# Patient Record
Sex: MALE | Race: WHITE | NOT HISPANIC OR LATINO
[De-identification: names, ages, dates, MRNs, and addresses within clinical notes are randomized per-mention and may not be internally consistent; named-entity substitution may affect disease eponyms.]

---

## 2018-05-25 PROBLEM — Z00.00 ENCOUNTER FOR PREVENTIVE HEALTH EXAMINATION: Status: ACTIVE | Noted: 2018-05-25

## 2018-06-27 ENCOUNTER — APPOINTMENT (OUTPATIENT)
Dept: HEART AND VASCULAR | Facility: CLINIC | Age: 63
End: 2018-06-27
Payer: COMMERCIAL

## 2018-06-27 VITALS
WEIGHT: 193 LBS | HEIGHT: 70.5 IN | SYSTOLIC BLOOD PRESSURE: 145 MMHG | BODY MASS INDEX: 27.32 KG/M2 | OXYGEN SATURATION: 97 % | HEART RATE: 87 BPM | DIASTOLIC BLOOD PRESSURE: 90 MMHG

## 2018-06-27 PROCEDURE — 93000 ELECTROCARDIOGRAM COMPLETE: CPT

## 2018-06-27 PROCEDURE — 99205 OFFICE O/P NEW HI 60 MIN: CPT | Mod: 25

## 2018-06-27 RX ORDER — ATORVASTATIN CALCIUM 40 MG/1
40 TABLET, FILM COATED ORAL
Qty: 90 | Refills: 3 | Status: ACTIVE | COMMUNITY
Start: 2018-06-27

## 2019-03-20 ENCOUNTER — APPOINTMENT (OUTPATIENT)
Dept: HEART AND VASCULAR | Facility: CLINIC | Age: 64
End: 2019-03-20
Payer: COMMERCIAL

## 2019-03-20 ENCOUNTER — APPOINTMENT (OUTPATIENT)
Dept: CARDIOTHORACIC SURGERY | Facility: CLINIC | Age: 64
End: 2019-03-20
Payer: COMMERCIAL

## 2019-03-20 ENCOUNTER — NON-APPOINTMENT (OUTPATIENT)
Age: 64
End: 2019-03-20

## 2019-03-20 VITALS
WEIGHT: 183 LBS | DIASTOLIC BLOOD PRESSURE: 83 MMHG | BODY MASS INDEX: 25.89 KG/M2 | SYSTOLIC BLOOD PRESSURE: 147 MMHG | OXYGEN SATURATION: 97 % | HEART RATE: 67 BPM

## 2019-03-20 VITALS
RESPIRATION RATE: 18 BRPM | SYSTOLIC BLOOD PRESSURE: 143 MMHG | DIASTOLIC BLOOD PRESSURE: 84 MMHG | HEART RATE: 88 BPM | TEMPERATURE: 98.1 F | OXYGEN SATURATION: 98 %

## 2019-03-20 PROCEDURE — 99204 OFFICE O/P NEW MOD 45 MIN: CPT

## 2019-03-20 PROCEDURE — 99214 OFFICE O/P EST MOD 30 MIN: CPT

## 2019-03-20 RX ORDER — METOPROLOL SUCCINATE 25 MG/1
25 TABLET, EXTENDED RELEASE ORAL DAILY
Qty: 90 | Refills: 3 | Status: ACTIVE | COMMUNITY
Start: 2019-03-20 | End: 1900-01-01

## 2019-03-20 NOTE — PHYSICAL EXAM
[General Appearance - Well Developed] : well developed [Normal Appearance] : normal appearance [Well Groomed] : well groomed [General Appearance - Well Nourished] : well nourished [No Deformities] : no deformities [General Appearance - In No Acute Distress] : no acute distress [Normal Conjunctiva] : the conjunctiva exhibited no abnormalities [Eyelids - No Xanthelasma] : the eyelids demonstrated no xanthelasmas [Normal Oral Mucosa] : normal oral mucosa [No Oral Pallor] : no oral pallor [No Oral Cyanosis] : no oral cyanosis [Normal Jugular Venous A Waves Present] : normal jugular venous A waves present [Normal Jugular Venous V Waves Present] : normal jugular venous V waves present [No Jugular Venous Woods A Waves] : no jugular venous woods A waves [Respiration, Rhythm And Depth] : normal respiratory rhythm and effort [Exaggerated Use Of Accessory Muscles For Inspiration] : no accessory muscle use [Auscultation Breath Sounds / Voice Sounds] : lungs were clear to auscultation bilaterally [Heart Rate And Rhythm] : heart rate and rhythm were normal [Abnormal Walk] : normal gait [Gait - Sufficient For Exercise Testing] : the gait was sufficient for exercise testing [Nail Clubbing] : no clubbing of the fingernails [Cyanosis, Localized] : no localized cyanosis [Petechial Hemorrhages (___cm)] : no petechial hemorrhages [Skin Color & Pigmentation] : normal skin color and pigmentation [] : no rash [No Venous Stasis] : no venous stasis [Skin Lesions] : no skin lesions [No Skin Ulcers] : no skin ulcer [No Xanthoma] : no  xanthoma was observed [Oriented To Time, Place, And Person] : oriented to person, place, and time [Mood] : the mood was normal [Affect] : the affect was normal [No Anxiety] : not feeling anxious [FreeTextEntry1] : 2/6 diastolic murmur at the RUSB

## 2019-03-21 NOTE — DATA REVIEWED
[FreeTextEntry1] : Echocardiogram 3/13/19 revealed:\par LVEF 55%. mild to moderate aortic regurgitation. aortic root is dilated at 4.45cm. ascending aorta measures 3.86. trileaflet aortic valve. \par \par Cardiac MRI 1/30/19 revealed:\par -aortic root 49mm. ascending aorta 37mm. mild aortic insufficiency. \par -mild enlargement of the left ventricle with normal regional systolic function. LVEF 54%.\par -there are no areas of focal hyperenhancement, consistent with absence of myocardial scarring/fibrosis.

## 2019-03-21 NOTE — CONSULT LETTER
[Dear  ___] : Dear  [unfilled], [FreeTextEntry2] : David Still MD [FreeTextEntry1] : I had the pleasure of seeing your patient, VANESSA FERRARA , in my office today. We take a multidisciplinary team approach to patient care and consider you, the referring physician, an extension of our team. We will maintain an open line of communication with you throughout your patient's treatment course.  \par \par Mr. FERRARA presents for an initial evaluation and management of thoracic aortic aneurysm. I have reviewed all of his  medical records and diagnostic images at the time of his office consultation. I have enclosed a copy for your records. \par \par I have also reviewed the indications for surgery, and used our webpage <<[email or web address]>> to illustrate the aorta and anatomy of the heart. The patient does not meet size criteria for surgical intervention at the time. \par \par Therefore, I have recommended that the patient will require a follow up appointment in 6 months with MRA chest to monitor his aortic pathology. My office will assist the patient with his upcoming appointment and I will update you on his progress at that time.\par \par I have discussed with the patient that we will continue to monitor his  aortic pathology closely at the [facility] that encompasses the entire health care system and is one of the largest in the nation at this point.\par \par It is our commitment to provide your patient with the highest quality of advanced therapeutic options. On behalf of the Cardiothoracic Surgery Team, thank you for sending your patient to the [facility] based at [facility].  If there are any questions or concerns, please call me directly at [phone].  \par \par Please see my note below. \par \par Sincerely, \par \par \par \par \par Derek Douglas M.D.\par Professor of Cardiovascular and Thoracic Surgery\par Minimally Invasive Valve Surgeon\par Director of Aortic Surgery, [facility]\par Cell: [phone]\par Email: [email or web address] \par \par [facility]:\par [address], [place] [address]\par Office: [phone]\par Fax: [phone]\par \par [facility]:\par Department of Cardiovascular and Thoracic Surgery\par [address] [place] [address], [address]\par Office: [phone]\par Fax: [phone]\par \par Practice Manager: Ms. Fiordaliza Villagomez\par Email: [email or web address]\par Phone: [phone]\par

## 2019-03-21 NOTE — HISTORY OF PRESENT ILLNESS
[FreeTextEntry1] : 63 year old male with a past medical history of hypertension, hyperlipidemia, RAMONA, CKD stage III (GFR 52), presents for evaluation and management of an ascending aortic aneurysm and aortic insufficiency.  \par \par Echocardiogram 3/13/19 revealed:\par LVEF 55%. mild to moderate aortic regurgitation. aortic root is dilated at 4.45cm. ascending aorta measures 3.86. trileaflet aortic valve. \par \par Cardiac MRI 1/30/19 revealed:\par -aortic root 49mm. ascending aorta 37mm. mild aortic insufficiency. \par -mild enlargement of the left ventricle with normal regional systolic function. LVEF 54%.\par -there are no areas of focal hyperenhancement, consistent with absence of myocardial scarring/fibrosis. \par my measurement of the aortic root is 45mm. \par \par Patient is doing well and denies recent hospitalization, ER visits, or surgeries. He  denies fever, chills, fatigue, headache, blurred vision, dizziness, syncope, chest pain, palpitations, shortness of breath, orthopnea, paroxysmal nocturnal dyspnea, nausea, vomiting, abdominal pain, back pain, BRBPR or swelling to legs.\par

## 2019-03-21 NOTE — ASSESSMENT
[FreeTextEntry1] : 63 year old male with a past medical history of hypertension, hyperlipidemia, RAMONA, CKD stage III (GFR 52), presents for evaluation and management of an ascending aortic aneurysm and aortic insufficiency.  \par \par Echocardiogram 3/13/19 revealed:\par LVEF 55%. mild to moderate aortic regurgitation. aortic root is dilated at 4.45cm. ascending aorta measures 3.86. trileaflet aortic valve. \par \par Cardiac MRI 1/30/19 revealed:\par -aortic root 49mm. ascending aorta 37mm. mild aortic insufficiency. \par -mild enlargement of the left ventricle with normal regional systolic function. LVEF 54%.\par -there are no areas of focal hyperenhancement, consistent with absence of myocardial scarring/fibrosis. \par my measurement of the aortic root is 45mm. \par \par I have reviewed the patient's medical records, diagnostic images during the time of this office consultation and have made the following recommendation. Review of the imaging shows his  aortic pathology has remained stable and does not require surgical intervention.\par \par Plan\par \par 1. Follow up in [facility] in six months with a MRA chest (to be scheduled via [facility]). \par 2. Continue medication regimen.\par 3. Follow up with cardiologist and PCP.\par

## 2019-03-21 NOTE — PROCEDURE
[FreeTextEntry1] : Dr. Douglas reviewed the indications for surgery, and used our webpage [email or web address] <[email or web address]> to illustrate the aorta and anatomy of the heart. Those indications are the following: size greater than 5.0 cm, symptomatic aneurysms, family history of aortic dissection or aneurysm death with a size greater than 4.5 cm, other necessary cardiac procedures such as coronary artery bypass grafting or valvular disorders with an aneurysm greater than 4.5 cm, or connective tissue disorders with an aneurysm size greater than 4.5 cm. The patient does not meet size criteria for surgical intervention at the time.\par \par Dr. Douglas discussed activity restrictions with the patient, and would advise exercise at a moderate amount with no heavy lifting over one third of body weight, and avoiding heart rates that exceed 140 beats per minute. In addition, every patient should abstain from tobacco abuse and to avoid all illicit drug use, especially stimulants such as cocaine or methamphetamine. Dr. Douglas also counseled regarding maintaining a healthy heart diet, and losing any excessive weight as this also put undue stress on both the aorta and entire cardiovascular system. First degree family members should be screened for bicuspid valve disease, and ascending aortic aneurysms. \par \par

## 2019-03-21 NOTE — PHYSICAL EXAM
[General Appearance - Alert] : alert [General Appearance - In No Acute Distress] : in no acute distress [Sclera] : the sclera and conjunctiva were normal [PERRL With Normal Accommodation] : pupils were equal in size, round, and reactive to light [Extraocular Movements] : extraocular movements were intact [Outer Ear] : the ears and nose were normal in appearance [Oropharynx] : the oropharynx was normal [Neck Appearance] : the appearance of the neck was normal [Neck Cervical Mass (___cm)] : no neck mass was observed [Jugular Venous Distention Increased] : there was no jugular-venous distention [Thyroid Diffuse Enlargement] : the thyroid was not enlarged [Thyroid Nodule] : there were no palpable thyroid nodules [Auscultation Breath Sounds / Voice Sounds] : lungs were clear to auscultation bilaterally [Normal Rate] : normal [Rhythm Regular] : regular [Normal S1] : normal S1 [Normal S2] : normal S2 [I] : a grade 1 [Examination Of The Chest] : the chest was normal in appearance [Chest Visual Inspection Thoracic Asymmetry] : no chest asymmetry [Diminished Respiratory Excursion] : normal chest expansion [2+] : left 2+ [Bowel Sounds] : normal bowel sounds [Abdomen Soft] : soft [Abdomen Tenderness] : non-tender [Abdomen Mass (___ Cm)] : no abdominal mass palpated [Cervical Lymph Nodes Enlarged Posterior Bilaterally] : posterior cervical [Cervical Lymph Nodes Enlarged Anterior Bilaterally] : anterior cervical [No CVA Tenderness] : no ~M costovertebral angle tenderness [No Spinal Tenderness] : no spinal tenderness [Abnormal Walk] : normal gait [Nail Clubbing] : no clubbing  or cyanosis of the fingernails [Musculoskeletal - Swelling] : no joint swelling seen [Motor Tone] : muscle strength and tone were normal [Skin Color & Pigmentation] : normal skin color and pigmentation [Skin Turgor] : normal skin turgor [] : no rash [Deep Tendon Reflexes (DTR)] : deep tendon reflexes were 2+ and symmetric [Sensation] : the sensory exam was normal to light touch and pinprick [No Focal Deficits] : no focal deficits [Oriented To Time, Place, And Person] : oriented to person, place, and time [Impaired Insight] : insight and judgment were intact [Affect] : the affect was normal [FreeTextEntry1] : deferred

## 2019-10-02 ENCOUNTER — CHART COPY (OUTPATIENT)
Age: 64
End: 2019-10-02

## 2019-10-09 ENCOUNTER — OTHER (OUTPATIENT)
Age: 64
End: 2019-10-09

## 2019-10-14 ENCOUNTER — OTHER (OUTPATIENT)
Age: 64
End: 2019-10-14

## 2019-10-16 ENCOUNTER — APPOINTMENT (OUTPATIENT)
Dept: CARDIOTHORACIC SURGERY | Facility: CLINIC | Age: 64
End: 2019-10-16

## 2019-10-31 NOTE — DATA REVIEWED
[FreeTextEntry1] : \par Echocardiogram 3/13/19 revealed:\par LVEF 55%. mild to moderate aortic regurgitation. aortic root is dilated at 4.45cm. ascending aorta measures 3.86. trileaflet aortic valve. \par \par Cardiac MRI 1/30/19 revealed:\par -aortic root 49mm. ascending aorta 37mm. mild aortic insufficiency. \par -mild enlargement of the left ventricle with normal regional systolic function. LVEF 54%.\par -there are no areas of focal hyperenhancement, consistent with absence of myocardial scarring/fibrosis. \par my measurement of the aortic root is 45mm. \par

## 2019-10-31 NOTE — ASSESSMENT
[FreeTextEntry1] : 64 year old male with a past medical history of hypertension, hyperlipidemia, RAMONA, CKD stage III (GFR 52), presents for evaluation and management of an ascending aortic aneurysm and aortic insufficiency. \par \par The patient's medical records and diagnostic images were reviewed at the time of this office visit, and the following recommendation was made. \par \par Plan:\par 1. Continue medication regimen\par 2. Follow up with PCP and cardiologist\par 3. BP control- I have recommended the patient to monitor his blood pressure closely. I have also advised the patient to take daily blood pressures at home and adhere to medication regimen.\par 4. Return to the [facility] in

## 2019-10-31 NOTE — CONSULT LETTER
[FreeTextEntry2] : Dr. David Still  [FreeTextEntry1] : . [FreeTextEntry3] : \par Derek Douglas M.D.\par Professor of Cardiovascular and Thoracic Surgery\par Minimally Invasive Valve Surgeon\par Director of Aortic Surgery, [facility]\par Cell: [phone]\par Email: [email or web address] \par \par [facility]:\par [address], [place] [address]\par Office: [phone]\par Fax: [phone]\par \par [facility]:\par Department of Cardiovascular and Thoracic Surgery\par [address] [place] [address], [address]\par Office: [phone]\par Fax: [phone]\par \par Practice Manager: Ms. Fiordaliza Villagomez\par Email: [email or web address]\par Phone: [phone]\par \par

## 2019-10-31 NOTE — HISTORY OF PRESENT ILLNESS
[FreeTextEntry1] : 64 year old male with a past medical history of hypertension, hyperlipidemia, RAMONA, CKD stage III (GFR 52), presents for evaluation and management of an ascending aortic aneurysm and aortic insufficiency. \par \par MRA chest XXXXX\par \par Patient is doing well and denies recent hospitalization, ER visits, or surgeries. He denies fever, chills, fatigue, headache, blurred vision, dizziness, syncope, chest pain, palpitations, shortness of breath, orthopnea, paroxysmal nocturnal dyspnea, nausea, vomiting, abdominal pain, back pain, BRBPR or swelling to legs.\par

## 2019-11-06 ENCOUNTER — APPOINTMENT (OUTPATIENT)
Dept: CARDIOTHORACIC SURGERY | Facility: CLINIC | Age: 64
End: 2019-11-06

## 2020-04-30 RX ORDER — FEBUXOSTAT 40 MG/1
40 TABLET ORAL DAILY
Qty: 30 | Refills: 0 | Status: ACTIVE | COMMUNITY
Start: 2020-04-30 | End: 1900-01-01

## 2020-05-11 ENCOUNTER — APPOINTMENT (OUTPATIENT)
Dept: NEPHROLOGY | Facility: CLINIC | Age: 65
End: 2020-05-11
Payer: COMMERCIAL

## 2020-05-11 VITALS — HEIGHT: 70 IN | BODY MASS INDEX: 26.92 KG/M2 | WEIGHT: 188 LBS

## 2020-05-11 PROCEDURE — 99204 OFFICE O/P NEW MOD 45 MIN: CPT | Mod: 95

## 2020-05-11 NOTE — HISTORY OF PRESENT ILLNESS
[Medical Office: ([facility])___] : at the medical office located in  [Home] : at home, [unfilled] , at the time of the visit. [Self] : self [Patient] : the patient [FreeTextEntry1] : Discussed with patient : You have chosen to receive care through the use of tele-media.  It enables healthcare providers at different locations to provide safe, effective, and convenient care through the use of technology.  Please note this is a billable encounter.  As with any healthcare service, there are risks associated with the use of tele-media, including  issues.  You understand that I cannot physically examine you and that you may need to come to the office to complete the assessment.   Patient agreed verbally and understands the risks and benefits of tele-media as explained.  All questions regarding tele-media encounters were answered.                                                                                                                                                                                                                                                                                     64-year-old man with a long history of hypertension, recurrent kidney stones, CKD 3, hyperlipidemia, mild to moderate aortic regurgitation, obstructive sleep apnea, and thoracic aneurysm measuring about 4.7 cm.  His creatinine has been in the 1.4-1.7 range, with the last lab in January showing a creatinine of 1.43, GFR 52, K5.1.  His lipids have been well controlled.  His BP is managed with Tekturna 300 mg daily, amlodipine 10 mg daily, bystolic 5 mg, and doxazosin 2 mg daily.  Stone prevention has been accomplished with large fluid intake, potassium citrate and Uloric.  He has had nocturia x1-2 chronically, presumably related to BPH.  He has not experienced any flank pain or hematuria.  He underwent a CT scan of the abdomen in December, 2019, which showed a normal-looking abdominal aorta, bilateral renal cysts, and 2 small nonobstructing stones in the right kidney measuring 1 to 2 mm.  His last BP was 128/78, but he has not been checking BP at home.  He currently has no blood pressure cuff.  He has gone for several cardiothoracic evaluations including [facility], [place], [place], and [place].  It is generally agreed that his aneurysm is about 4.7 cm, and the plan has been to get an echo every 6 months, and an annual CT.. His PCP is Dr. Ming Parada and his cardiologist is Dr. Clarke Rosas.  He continues to exercise but knows he has to avoid strenuous weight lifting.  His creatinine has been in the 1.4-1.7 range for years so renal function has essentially been stable.  His BP on in August 2019 office visit was 112-114/60-65.  He understands that the most critical aspect of controlling the size of his aneurysm is good blood pressure control.

## 2020-05-11 NOTE — CONSULT LETTER
[Dear  ___] : Dear  [unfilled], [Consult Closing:] : Thank you very much for allowing me to participate in the care of this patient.  If you have any questions, please do not hesitate to contact me. [Please see my note below.] : Please see my note below. [FreeTextEntry2] : Dr Ming Parada [FreeTextEntry1] : Ming Kwok - hope you and the whole family are well.  Same to you , Clarke! [FreeTextEntry3] : Sincerely, \par \par Presley Segovia MD, FACP  [Sincerely,] : Sincerely, [DrGinger  ___] : Dr. JEAN BAPTISTE

## 2020-05-11 NOTE — PHYSICAL EXAM
[General Appearance - Alert] : alert [General Appearance - In No Acute Distress] : in no acute distress [Sclera] : the sclera and conjunctiva were normal [PERRL With Normal Accommodation] : pupils were equal in size, round, and reactive to light [Neck Appearance] : the appearance of the neck was normal [Outer Ear] : the ears and nose were normal in appearance [Neck Cervical Mass (___cm)] : no neck mass was observed [Skin Turgor] : normal skin turgor [Skin Color & Pigmentation] : normal skin color and pigmentation [Sensation] : the sensory exam was normal to light touch and pinprick [No Focal Deficits] : no focal deficits [Deep Tendon Reflexes (DTR)] : deep tendon reflexes were 2+ and symmetric [] : no rash [Affect] : the affect was normal [Oriented To Time, Place, And Person] : oriented to person, place, and time [Impaired Insight] : insight and judgment were intact

## 2020-06-17 RX ORDER — ALISKIREN HEMIFUMARATE 300 MG/1
300 TABLET, FILM COATED ORAL DAILY
Qty: 30 | Refills: 6 | Status: DISCONTINUED | COMMUNITY
End: 2020-06-17

## 2021-01-14 ENCOUNTER — RX RENEWAL (OUTPATIENT)
Age: 66
End: 2021-01-14

## 2021-04-14 ENCOUNTER — RX RENEWAL (OUTPATIENT)
Age: 66
End: 2021-04-14

## 2021-04-26 ENCOUNTER — RX RENEWAL (OUTPATIENT)
Age: 66
End: 2021-04-26

## 2021-05-03 ENCOUNTER — APPOINTMENT (OUTPATIENT)
Dept: NEPHROLOGY | Facility: CLINIC | Age: 66
End: 2021-05-03

## 2021-05-13 ENCOUNTER — APPOINTMENT (OUTPATIENT)
Dept: NEPHROLOGY | Facility: CLINIC | Age: 66
End: 2021-05-13
Payer: COMMERCIAL

## 2021-05-13 VITALS — WEIGHT: 185 LBS | BODY MASS INDEX: 26.48 KG/M2 | HEIGHT: 70 IN

## 2021-05-13 DIAGNOSIS — I71.2 THORACIC AORTIC ANEURYSM, W/OUT RUPTURE: ICD-10-CM

## 2021-05-13 DIAGNOSIS — I77.810 THORACIC AORTIC ECTASIA: ICD-10-CM

## 2021-05-13 DIAGNOSIS — R01.1 CARDIAC MURMUR, UNSPECIFIED: ICD-10-CM

## 2021-05-13 PROCEDURE — 99443: CPT | Mod: 95

## 2021-05-13 NOTE — ASSESSMENT
[FreeTextEntry1] : 65-year-old man with well-controlled hypertension, stable CKD which is actually improved slightly in the last 2 blood draws.  He has not experienced any renal colic despite a history of stones.  We discussed at length possible reasons for his creatinine to improve and the only 2 that makes sense would be loss of muscle mass in the last 14 months, as well as very high fluid intake.  He will continue to follow-up with a variety of physicians including his PCP, cardiologist, and urologist.  We will continue to follow BP and renal function.  Time spent 26 minutes

## 2021-05-13 NOTE — HISTORY OF PRESENT ILLNESS
[Home] : at home, [unfilled] , at the time of the visit. [Medical Office: ([facility])___] : at the medical office located in  [Verbal consent obtained from patient] : the patient, [unfilled] [FreeTextEntry1] : Discussed with patient : You have chosen to receive care through the use of tele-media.  It enables healthcare providers at different locations to provide safe, effective, and convenient care through the use of technology.  Please note this is a billable encounter.  As with any healthcare service, there are risks associated with the use of tele-media, including  issues.  You understand that I cannot physically examine you and that you may need to come to the office to complete the assessment.   Patient agreed verbally and understands the risks and benefits of tele-media as explained.  All questions regarding tele-media encounters were answered.\par       65-year-old man with a long history of hypertension, kidney stones, CKD 3, hyperlipidemia, mild to moderate aortic regurgitation, thoracic aneurysm measuring 4.7 cm, obstructive sleep apnea, BPH.  His creatinine has generally been in the 1.4-1.5 range, but has settled at 1.28-1.29 in the last 6 months with a GFR in the mid 50s.  The reason for improvement is not entirely clear but he has been drinking large quantities of water over 2-1/2 L/day, and he had to stop lifting light weights which might have affected his muscle mass.  His lipids are well controlled.  His BP is treated with aliskiren 300 mg daily, amlodipine 10 mg daily, Bystolic 5 mg daily, and doxazosin 2 mg daily.  Stone prevention has revolved around large fluid intake, potassiums citrate, and Uloric.  He recently saw a urologist, Dr. Coyle, in [facility] and renal cysts were noted which have been present for years.  His BP has been consistently under 135 never higher.  He recalls a couple of readings in doctors offices of about 130/78.  He has no hematuria or flank pain.  Urinalysis shows no protein.  His hemoglobin was 14.7 and his total cholesterol was 136.  He experienced neck pain right before the pandemic began, and wound up having physiotherapy and an epidural injection.  He has not lifted weights since then.

## 2021-05-17 ENCOUNTER — RX RENEWAL (OUTPATIENT)
Age: 66
End: 2021-05-17

## 2021-06-01 ENCOUNTER — RX RENEWAL (OUTPATIENT)
Age: 66
End: 2021-06-01

## 2021-06-29 ENCOUNTER — RX RENEWAL (OUTPATIENT)
Age: 66
End: 2021-06-29

## 2021-07-19 ENCOUNTER — RX RENEWAL (OUTPATIENT)
Age: 66
End: 2021-07-19

## 2021-09-21 ENCOUNTER — RX RENEWAL (OUTPATIENT)
Age: 66
End: 2021-09-21

## 2021-10-20 ENCOUNTER — RX RENEWAL (OUTPATIENT)
Age: 66
End: 2021-10-20

## 2021-11-22 ENCOUNTER — RX RENEWAL (OUTPATIENT)
Age: 66
End: 2021-11-22

## 2021-11-28 ENCOUNTER — RX RENEWAL (OUTPATIENT)
Age: 66
End: 2021-11-28

## 2021-12-16 ENCOUNTER — NON-APPOINTMENT (OUTPATIENT)
Age: 66
End: 2021-12-16

## 2021-12-20 ENCOUNTER — RX RENEWAL (OUTPATIENT)
Age: 66
End: 2021-12-20

## 2022-02-28 ENCOUNTER — RX RENEWAL (OUTPATIENT)
Age: 67
End: 2022-02-28

## 2022-03-21 ENCOUNTER — RX RENEWAL (OUTPATIENT)
Age: 67
End: 2022-03-21

## 2022-04-18 ENCOUNTER — RX RENEWAL (OUTPATIENT)
Age: 67
End: 2022-04-18

## 2022-04-28 ENCOUNTER — APPOINTMENT (OUTPATIENT)
Dept: NEPHROLOGY | Facility: CLINIC | Age: 67
End: 2022-04-28
Payer: COMMERCIAL

## 2022-04-28 ENCOUNTER — NON-APPOINTMENT (OUTPATIENT)
Age: 67
End: 2022-04-28

## 2022-04-28 VITALS
HEIGHT: 70 IN | WEIGHT: 185 LBS | HEART RATE: 72 BPM | SYSTOLIC BLOOD PRESSURE: 126 MMHG | DIASTOLIC BLOOD PRESSURE: 82 MMHG | BODY MASS INDEX: 26.48 KG/M2

## 2022-04-28 DIAGNOSIS — Z82.49 FAMILY HISTORY OF ISCHEMIC HEART DISEASE AND OTHER DISEASES OF THE CIRCULATORY SYSTEM: ICD-10-CM

## 2022-04-28 DIAGNOSIS — I71.9 AORTIC ANEURYSM OF UNSPECIFIED SITE, W/OUT RUPTURE: ICD-10-CM

## 2022-04-28 PROCEDURE — 99214 OFFICE O/P EST MOD 30 MIN: CPT

## 2022-04-28 NOTE — ASSESSMENT
[FreeTextEntry1] : 66-year-old man with controlled hypertension, no recent episodes of renal colic or new stones, no flank pain or hematuria.  His lipids are beautifully controlled, his thoracic aortic aneurysm is stable in size.  I asked him to see Dr. Coyle in regard to whether any further imaging is needed on his renal cysts.  I am satisfied that his renal function is nicely stable.  He will return in 4 to 6 months, with labs to be done before hand.  I have renewed all of his blood pressure medicines as well as potassiums citrate and Uloric

## 2022-04-28 NOTE — CONSULT LETTER
[Dear  ___] : Dear  [unfilled], [Consult Letter:] : I had the pleasure of evaluating your patient, [unfilled]. [Please see my note below.] : Please see my note below. [Consult Closing:] : Thank you very much for allowing me to participate in the care of this patient.  If you have any questions, please do not hesitate to contact me. [Sincerely,] : Sincerely, [FreeTextEntry2] : Ming MIKE [FreeTextEntry1] : Yves Lai - hope all is well - waiting for you to appear on the [employer] tour, or maybe Vlad Fall's saudi tour!   Best, Jake [FreeTextEntry3] : Sincerely, \par \par Presley Segovia MD, FACP  [DrGinger  ___] : Dr. JEAN BAPTISTE [DrGinger ___] : Dr. JEAN BAPTISTE

## 2022-04-28 NOTE — HISTORY OF PRESENT ILLNESS
[FreeTextEntry1] : 66-year-old man with a long history of hypertension, recurrent nephrolithiasis, CKD 3, hyperlipidemia, mild to moderate aortic regurgitation, a thoracic aneurysm measuring 4.7 cm, obstructive sleep apnea and BPH.  His creatinine was stable in the 1.4-1.5 range and has now settled in the last year at about 1.3-1.4, with the latest at 1.37, GFR 54, sis statin 1.26, and GFR derived from sis statin is slightly higher at 56.  His LDL is well controlled at 68.  Hemoglobin 14.1.  PSA 1.05, uric acid 5.5, urinalysis entirely normal.  He showed me the report of a CT from his urologist, Dr. Fernando Coyle, showing multiple right renal cysts and a 7 mm left renal stone.  He underwent ESWL in 2010 and through 2012 his cardiologist is Dr. Clarke Gould in [facility], and his PCP is Dr. Ming Parada in [place].  His BP has been well controlled, typically in the 120s over high 70s at home.  He exercises regularly.  He starts the day by drinking a 16 ounce bottle of water for stone prevention and then tries to supplement it through the day.

## 2022-05-18 ENCOUNTER — RX RENEWAL (OUTPATIENT)
Age: 67
End: 2022-05-18

## 2022-10-06 ENCOUNTER — APPOINTMENT (OUTPATIENT)
Dept: MRI IMAGING | Facility: CLINIC | Age: 67
End: 2022-10-06

## 2023-01-05 ENCOUNTER — RX RENEWAL (OUTPATIENT)
Age: 68
End: 2023-01-05

## 2023-01-12 ENCOUNTER — APPOINTMENT (OUTPATIENT)
Dept: NEPHROLOGY | Facility: CLINIC | Age: 68
End: 2023-01-12
Payer: COMMERCIAL

## 2023-01-12 VITALS — BODY MASS INDEX: 26.48 KG/M2 | WEIGHT: 185 LBS | HEIGHT: 70 IN

## 2023-01-12 PROCEDURE — 99443: CPT

## 2023-01-12 NOTE — REVIEW OF SYSTEMS
[Chills] : chills [Feeling Tired] : feeling tired [Sore Throat] : sore throat [Cough] : cough [Negative] : Heme/Lymph

## 2023-01-12 NOTE — ASSESSMENT
[FreeTextEntry1] : 67-year-old man with a number of medical problems, who has developed COVID despite being fully vaccinated.  His symptoms are mild to moderate, but he certainly does have risk factors.  He was given renal Paxlovid.  We had a lengthy discussion about each of his medications.  I reviewed the literature on all of them.  There are 3 that are relevant in terms of drug interaction.  I advised him to stop aliskiren for the full 5 days, +2 more days after stopping Paxlovid.  I asked him to hold atorvastatin completely for the 5 days he is on treatment.  And he will decrease amlodipine to 5 mg daily for the 5 days of antiviral treatment.  He is to let me know if he runs into any problems with his BP during that time.  The treatment.  His brief and his BP should not rise very much.  Time spent 21 minutes

## 2023-01-12 NOTE — HISTORY OF PRESENT ILLNESS
[Home] : at home, [unfilled] , at the time of the visit. [Medical Office: ([facility])___] : at the medical office located in  [Verbal consent obtained from patient] : the patient, [unfilled] [FreeTextEntry1] : Discussed with patient : You have chosen to receive care through the use of tele-media.  It enables healthcare providers at different locations to provide safe, effective, and convenient care through the use of technology.  Please note this is a billable encounter.  As with any healthcare service, there are risks associated with the use of tele-media, including  issues.  You understand that I cannot physically examine you and that you may need to come to the office to complete the assessment.   Patient agreed verbally and understands the risks and benefits of tele-media as explained.  All questions regarding tele-media encounters were answered.\par                                                                                                                                                                                                                              67-year-old man with a long history of hypertension, recurrent nephrolithiasis, CKD stage IIIa, hyperlipidemia, mild to moderate aortic regurgitation, thoracic aneurysm measuring 4.7 cm, obstructive sleep apnea, and BPH.  His creatinine has recently been stable in the 1.3-1.4 range with a GFR in the high 50s, slightly higher when utilizing Cystatin C.  LDL has been well controlled on atorvastatin.  Hemoglobin has been normal.  Uric acid has been consistently under 6 on 4 boxes that for years.  Urinalysis has been normal.  He has multiple right renal cysts, and underwent ESWL in 2010.  He follows up with his cardiologist Dr. Clarke Rosas and his cardiothoracic surgeon, Dr. Derek Douglas.  He drinks a large volume of water every day preventatively.  He called today regarding COVID infection.  He is fully vaccinated but tested positive yesterday.  He has no fever, but has chilliness, achiness, moderate cough, and was prescribed renal paxlovid at [facility].  We discussed the pros and cons at length, including the fact that some patients develop rebound symptoms.  He is 67 years old and has underlying medical problems.

## 2023-03-15 DIAGNOSIS — R06.83 SNORING: ICD-10-CM

## 2023-03-27 ENCOUNTER — RX RENEWAL (OUTPATIENT)
Age: 68
End: 2023-03-27

## 2023-03-28 ENCOUNTER — APPOINTMENT (OUTPATIENT)
Dept: NEPHROLOGY | Facility: CLINIC | Age: 68
End: 2023-03-28

## 2023-05-07 ENCOUNTER — RX RENEWAL (OUTPATIENT)
Age: 68
End: 2023-05-07

## 2023-05-22 ENCOUNTER — RX RENEWAL (OUTPATIENT)
Age: 68
End: 2023-05-22

## 2023-08-07 ENCOUNTER — RX RENEWAL (OUTPATIENT)
Age: 68
End: 2023-08-07

## 2023-08-23 ENCOUNTER — APPOINTMENT (OUTPATIENT)
Dept: NEPHROLOGY | Facility: CLINIC | Age: 68
End: 2023-08-23
Payer: COMMERCIAL

## 2023-08-23 VITALS
BODY MASS INDEX: 26.48 KG/M2 | SYSTOLIC BLOOD PRESSURE: 116 MMHG | WEIGHT: 185 LBS | HEART RATE: 68 BPM | DIASTOLIC BLOOD PRESSURE: 71 MMHG | HEIGHT: 70 IN

## 2023-08-23 DIAGNOSIS — E79.0 HYPERURICEMIA W/OUT SIGNS OF INFLAMMATORY ARTHRITIS AND TOPHACEOUS DISEASE: ICD-10-CM

## 2023-08-23 DIAGNOSIS — N20.0 CALCULUS OF KIDNEY: ICD-10-CM

## 2023-08-23 DIAGNOSIS — N18.30 CHRONIC KIDNEY DISEASE, STAGE 3 UNSPECIFIED: ICD-10-CM

## 2023-08-23 DIAGNOSIS — I10 ESSENTIAL (PRIMARY) HYPERTENSION: ICD-10-CM

## 2023-08-23 PROCEDURE — 99215 OFFICE O/P EST HI 40 MIN: CPT

## 2023-08-23 RX ORDER — AMLODIPINE BESYLATE 10 MG/1
10 TABLET ORAL
Qty: 90 | Refills: 2 | Status: ACTIVE | COMMUNITY
Start: 2018-06-27 | End: 1900-01-01

## 2023-08-23 RX ORDER — FEBUXOSTAT 40 MG/1
40 TABLET ORAL DAILY
Qty: 90 | Refills: 2 | Status: ACTIVE | COMMUNITY
Start: 2018-06-27 | End: 1900-01-01

## 2023-08-23 RX ORDER — POTASSIUM CITRATE 10 MEQ/1
10 MEQ TABLET, EXTENDED RELEASE ORAL DAILY
Qty: 90 | Refills: 2 | Status: ACTIVE | COMMUNITY
Start: 2018-06-27 | End: 1900-01-01

## 2023-08-23 NOTE — HISTORY OF PRESENT ILLNESS
[FreeTextEntry1] : 68-year-old man with a long history of hypertension, recurrent nephrolithiasis, CKD stage IIIa, hyperlipidemia, mild to moderate aortic regurgitation, thoracic aneurysm measuring 4.7 cm, obstructive sleep apnea, BPH, hyperuricemia.  His creatinine has been stable in the 1.3-1.4 range, and was 1.4 on June blood work.  His BP has been well controlled on a regimen including amlodipine, aliskiren, doxazosin.  He is on K citrate for stone prevention.  He is on febuxostat because of hyperuricemia.  June labs included a BUN of 16, GFR 56, K5.0, CO2 28, LDL 50, hemoglobin 13.4.  He has been compliant with CPAP although he does find it annoying and has to get up every 2 hours because of dry mouth.  However he does note a difference and is willing to continue.  He is about to have an oral appliance starting today.  He is seeing his ENT, Dr. Jacek Romero who found that he has sinusitis and a deviated septum.  He took antibiotics but apparently no symptomatic change.  He remains on atorvastatin 40 mg daily which has been very effective and well-tolerated.  His cardiologist is Dr. Clarke Gould and his BP they are was about 120/78 3 months ago.  He does do regular exercise and has a  at least 3 times a week.  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  He is not able to swim laps so that is not an option.  He he will continue to have regular serial echo's to follow the thoracic aorta.  He tolerates all of his meds well without any apparent side effects.  I reminded him to maintain high fluid intake which she is doing.  He drinks a 23 ounce bottle of water when he first wakes up and again 2 more times during the day.

## 2023-08-23 NOTE — PHYSICAL EXAM
[General Appearance - Alert] : alert [General Appearance - In No Acute Distress] : in no acute distress [Sclera] : the sclera and conjunctiva were normal [PERRL With Normal Accommodation] : pupils were equal in size, round, and reactive to light [Outer Ear] : the ears and nose were normal in appearance [Neck Appearance] : the appearance of the neck was normal [Neck Cervical Mass (___cm)] : no neck mass was observed [Skin Color & Pigmentation] : normal skin color and pigmentation [Skin Turgor] : normal skin turgor [] : no rash [Deep Tendon Reflexes (DTR)] : deep tendon reflexes were 2+ and symmetric [No Focal Deficits] : no focal deficits [Oriented To Time, Place, And Person] : oriented to person, place, and time [Impaired Insight] : insight and judgment were intact [Affect] : the affect was normal

## 2023-08-23 NOTE — ASSESSMENT
[FreeTextEntry1] : 68-year-old man with multiple complex problems, but generally stable and doing well.  BP is very well controlled.  Renal function is stable.  Hyperlipidemia is beautifully controlled.  He is exercising faithfully.  He is using nasal CPAP successfully.  He is very compliant with all his medications.  I have renewed 5 of them including doxazosin, aliskiren, amlodipine, K citrate, febuxostat.  I have ordered labs to be repeated in 4 months, to include uric acid, BMP, Cystatin C, PTH.  He will follow-up regularly with Augustine Rosas and Nick.  He will continue his large fluid intake, regular exercise, CPAP.  I encouraged him to increase his stairmaster time if it does not irritate the neuropathy in his left leg.  Time spent 45 minutes

## 2023-08-23 NOTE — CONSULT LETTER
[Dear  ___] : Dear  [unfilled], [Consult Letter:] : I had the pleasure of evaluating your patient, [unfilled]. [Please see my note below.] : Please see my note below. [Consult Closing:] : Thank you very much for allowing me to participate in the care of this patient.  If you have any questions, please do not hesitate to contact me. [Sincerely,] : Sincerely, [FreeTextEntry2] : Dr Clarke Rosas [FreeTextEntry3] : Sincerely,   Presley Segovia MD, FACP [DrGinger  ___] : Dr. JEAN BAPTISTE

## 2023-12-20 ENCOUNTER — APPOINTMENT (OUTPATIENT)
Dept: NEPHROLOGY | Facility: CLINIC | Age: 68
End: 2023-12-20

## 2024-03-06 ENCOUNTER — RX RENEWAL (OUTPATIENT)
Age: 69
End: 2024-03-06

## 2024-03-06 RX ORDER — DOXAZOSIN 2 MG/1
2 TABLET ORAL DAILY
Qty: 90 | Refills: 0 | Status: ACTIVE | COMMUNITY
Start: 2020-04-30 | End: 1900-01-01

## 2024-05-21 ENCOUNTER — APPOINTMENT (OUTPATIENT)
Dept: NEPHROLOGY | Facility: CLINIC | Age: 69
End: 2024-05-21

## 2024-06-08 ENCOUNTER — RX RENEWAL (OUTPATIENT)
Age: 69
End: 2024-06-08

## 2024-06-08 RX ORDER — ALISKIREN HEMIFUMARATE 300 MG/1
300 TABLET, FILM COATED ORAL
Qty: 90 | Refills: 0 | Status: ACTIVE | COMMUNITY
Start: 2018-06-27 | End: 1900-01-01

## 2024-07-15 ENCOUNTER — APPOINTMENT (OUTPATIENT)
Dept: NEPHROLOGY | Facility: CLINIC | Age: 69
End: 2024-07-15

## 2024-08-07 ENCOUNTER — RX RENEWAL (OUTPATIENT)
Age: 69
End: 2024-08-07

## 2024-08-08 ENCOUNTER — APPOINTMENT (OUTPATIENT)
Dept: NEPHROLOGY | Facility: CLINIC | Age: 69
End: 2024-08-08

## 2024-08-22 ENCOUNTER — APPOINTMENT (OUTPATIENT)
Dept: NEPHROLOGY | Facility: CLINIC | Age: 69
End: 2024-08-22
Payer: MEDICARE

## 2024-08-22 VITALS
SYSTOLIC BLOOD PRESSURE: 115 MMHG | HEIGHT: 70 IN | DIASTOLIC BLOOD PRESSURE: 75 MMHG | WEIGHT: 185 LBS | BODY MASS INDEX: 26.48 KG/M2

## 2024-08-22 DIAGNOSIS — N25.81 SECONDARY HYPERPARATHYROIDISM OF RENAL ORIGIN: ICD-10-CM

## 2024-08-22 DIAGNOSIS — I10 ESSENTIAL (PRIMARY) HYPERTENSION: ICD-10-CM

## 2024-08-22 DIAGNOSIS — N18.30 CHRONIC KIDNEY DISEASE, STAGE 3 UNSPECIFIED: ICD-10-CM

## 2024-08-22 DIAGNOSIS — N20.0 CALCULUS OF KIDNEY: ICD-10-CM

## 2024-08-22 PROCEDURE — 99443: CPT | Mod: 93

## 2024-08-22 RX ORDER — CALCITRIOL 0.25 UG/1
0.25 CAPSULE, LIQUID FILLED ORAL
Qty: 90 | Refills: 1 | Status: ACTIVE | COMMUNITY
Start: 2024-08-22 | End: 1900-01-01

## 2024-08-22 NOTE — HISTORY OF PRESENT ILLNESS
[Home] : at home, [unfilled] , at the time of the visit. [Medical Office: ([facility])___] : at the medical office located in  [Verbal consent obtained from patient] : the patient, [unfilled] [FreeTextEntry1] : Discussed with patient : You have chosen to receive care through the use of tele-media.  It enables healthcare providers at different locations to provide safe, effective, and convenient care through the use of technology.  Please note this is a billable encounter.  As with any healthcare service, there are risks associated with the use of tele-media, including  issues.  You understand that I cannot physically examine you and that you may need to come to the office to complete the assessment.   Patient agreed verbally and understands the risks and benefits of tele-media as explained.  All questions regarding tele-media encounters were answered.                        69-year-old man with a long history of hypertension, recurrent nephrolithiasis, stage IIIa CKD, hyperlipidemia, mild to moderate aortic regurgitation, 4.7 cm thoracic aneurysm, obstructive sleep apnea on CPAP, BPH, and hyperuricemia.  His creatinine has traditionally been in the 1.3-1.4 range and was 1.4 in June 2023.  His BP has been consistently well-controlled on a 3drug regimen of amlodipine, aliskiren, and doxazosin.  He also takes potassium citrate for stone prevention.  He is on febuxostat because of hyperuricemia.  His current labs show a creatinine up to 1.48, BUN up from 25-30, probably attributable to a recent Medrol Dosepak, GFR based on creatinine was 52, but only 40 by Cystatin C.  His K is 4.9, CO2 28, calcium 10.0, and PTH is now 152 where it has been previously normal.  His phosphorus has been normal in the past but not low.  He has no stone symptoms.  His BP is consistently normal on doctor visits, averaging about 115/75.  He is very faithful to regular exercise.

## 2024-08-22 NOTE — ASSESSMENT
[FreeTextEntry1] : 69-year-old man with excellent BP control, but slight worsening of kidney function in the last year, and emergence of what appears to be secondary hyperparathyroidism.  I am starting him on calcitriol 0.25 mcg daily.  He will continue his usual exercise and antihypertensive drugs.  Time spent 24 minutes

## 2024-09-02 ENCOUNTER — RX RENEWAL (OUTPATIENT)
Age: 69
End: 2024-09-02

## 2024-12-02 ENCOUNTER — RX RENEWAL (OUTPATIENT)
Age: 69
End: 2024-12-02

## 2025-01-20 ENCOUNTER — RX RENEWAL (OUTPATIENT)
Age: 70
End: 2025-01-20

## 2025-02-19 ENCOUNTER — RX RENEWAL (OUTPATIENT)
Age: 70
End: 2025-02-19

## 2025-03-04 ENCOUNTER — RX RENEWAL (OUTPATIENT)
Age: 70
End: 2025-03-04

## 2025-06-25 ENCOUNTER — APPOINTMENT (OUTPATIENT)
Dept: NEPHROLOGY | Facility: CLINIC | Age: 70
End: 2025-06-25

## 2025-07-15 ENCOUNTER — APPOINTMENT (OUTPATIENT)
Dept: NEPHROLOGY | Facility: CLINIC | Age: 70
End: 2025-07-15
Payer: MEDICARE

## 2025-07-15 VITALS
HEIGHT: 70 IN | SYSTOLIC BLOOD PRESSURE: 122 MMHG | BODY MASS INDEX: 26.48 KG/M2 | WEIGHT: 185 LBS | DIASTOLIC BLOOD PRESSURE: 72 MMHG

## 2025-07-15 PROCEDURE — G2211 COMPLEX E/M VISIT ADD ON: CPT | Mod: 93

## 2025-07-15 PROCEDURE — 99204 OFFICE O/P NEW MOD 45 MIN: CPT | Mod: 93

## 2025-09-01 ENCOUNTER — RX RENEWAL (OUTPATIENT)
Age: 70
End: 2025-09-01